# Patient Record
Sex: MALE | Race: WHITE
[De-identification: names, ages, dates, MRNs, and addresses within clinical notes are randomized per-mention and may not be internally consistent; named-entity substitution may affect disease eponyms.]

---

## 2018-06-02 ENCOUNTER — HOSPITAL ENCOUNTER (EMERGENCY)
Dept: HOSPITAL 17 - PHEFT | Age: 33
Discharge: HOME | End: 2018-06-02
Payer: SELF-PAY

## 2018-06-02 VITALS
SYSTOLIC BLOOD PRESSURE: 134 MMHG | OXYGEN SATURATION: 98 % | HEART RATE: 119 BPM | TEMPERATURE: 99 F | DIASTOLIC BLOOD PRESSURE: 73 MMHG | RESPIRATION RATE: 20 BRPM

## 2018-06-02 DIAGNOSIS — L03.116: Primary | ICD-10-CM

## 2018-06-02 PROCEDURE — 99283 EMERGENCY DEPT VISIT LOW MDM: CPT

## 2018-06-02 NOTE — PD
HPI


Chief Complaint:  Skin Problem


Time Seen by Provider:  13:40


Travel History


International Travel<30 days:  No


Contact w/Intl Traveler<30days:  No


Traveled to known affect area:  No





History of Present Illness


HPI


33-year-old male presents emergency department for evaluation of left lower 

extremity swelling that started 3 days ago.  Says that he may been bit by 

something and the leg has become more swollen and red so decided to come to 

emergency department to department today for evaluation.  Denies pain to the 

area.  Says he has been able to get some white fluid out of a lesion in the 

center of the redness.  Says he was recently released from care home and believes 

that he may have picked up an infection there.  He is unsure if the spider bit 

him or if it is an ingrown hair.  Denies numbness or tingling or any other 

complaints today regarding this. He denies fevers or chills. His tetanus was 

within the last 5 years.





PFSH


Past Medical History


Diminished Hearing:  No


Tetanus Vaccination:  < 5 Years


Influenza Vaccination:  No





Social History


Alcohol Use:  No (DENIES)


Tobacco Use:  No


Substance Use:  Yes





Allergies-Medications


(Allergen,Severity, Reaction):  


Coded Allergies:  


     No Known Allergies (Verified  Adverse Reaction, Unknown, 6/2/18)


Reported Meds & Prescriptions





Reported Meds & Active Scripts


Active


Bactrim DS (Sulfamethoxazole-Trimethoprim) 800-160 Mg Tab 1 Tab PO BID


Keflex (Cephalexin) 500 Mg Cap 500 Mg PO Q8H 10 Days








Review of Systems


Except as stated in HPI:  all other systems reviewed are Neg





Physical Exam


Narrative


GENERAL: Well-nourished, well-developed patient, slightly anxious.


SKIN: Focused skin assessment warm/dry. No rashes or lesions.


LLE- mid calf area of central puncta with surrounding erythema. edema noted to 

lower extremity. neurovascularly intact lower extremity.


HEAD: Normocephalic. Atraumatic.


EYES: No scleral icterus. No injection or drainage.  PERRLA, EOMI


THROAT: No pharyngeal injection, exudates, or tonsillar hypertrophy. Airway is 

patent.


NECK: Supple, trachea midline. No JVD or lymphadenopathy. No meningismus.


MUSCULOSKELETAL: No cyanosis, or edema. 


BACK: Nontender without obvious deformity. No CVA tenderness.





Data


Data


Last Documented VS





Vital Signs








  Date Time  Temp Pulse Resp B/P (MAP) Pulse Ox O2 Delivery O2 Flow Rate FiO2


 


6/2/18 13:07 99.0 119 20 134/73 (93) 98   








Orders





 Orders


Ed Discharge Order (6/2/18 13:53)








MDM


Medical Decision Making


Medical Screen Exam Complete:  Yes


Emergency Medical Condition:  Yes


Differential Diagnosis


Left lower extremity cellulitis, erysipelas, abscess


Narrative Course


33-year-old male presents emergency department for evaluation of left lower 

extremity swelling that started 3 days ago.  Says that he may been bit by 

something and the leg has become more swollen and red so decided to come to 

emergency department to department today for evaluation.  Denies pain to the 

area.  Says he has been able to get some white fluid out of a lesion in the 

center of the redness.  Says he was recently released from care home and believes 

that he may have picked up an infection there.  He is unsure if the spider bit 

him or if it is an ingrown hair.  Denies numbness or tingling or any other 

complaints today regarding this. He denies fevers or chills. His tetanus was 

within the last 5 years. 


Vital signs demonstrate temperature 99.0, . 


Note that patient appeared to be somewhat anxious but nontoxic.  The erythema 

about the left calf is focused around the lesion.  There is no lymph 

angiopathic spread.  There is mild edema distally.  Neurovascular intact left 

lower extremity.


Because the patient is young and healthy, I do not believe labs are necessary. 

I believe that if he does not take his antibiotics that he may worsen. I 

explained the importance of starting these medications today and he 

understands. I believe this may take 5-7 days to improve.


He is discharged with Bactrim and Keflex. He should return if his symptoms 

persist or worsen.





Diagnosis





 Primary Impression:  


 Cellulitis


 Qualified Codes:  L03.116 - Cellulitis of left lower limb


Referrals:  


[facility]





***Additional Instructions:  


Use warm compresses directly over the area to assist drainage for 1-2 days.


He may use cool compresses for the swelling otherwise.  Recommend elevating the 

leg to reduce swelling.


He may use Tylenol or Motrin per package instructions for pain.


Take antibiotics as prescribed to reduce complications of this infection.


Scripts


Sulfamethoxazole-Trimethoprim (Bactrim DS) 800-160 Mg Tab


1 TAB PO BID for Infection, #20 TAB 0 Refills


   Prov: Jayda Rios MD         6/2/18 


Cephalexin (Keflex) 500 Mg Cap


500 MG PO Q8H for Infection for 10 Days, #30 CAP 0 Refills


   Prov: Jayda Rios MD         6/2/18


Disposition:  01 DISCHARGE HOME


Condition:  Stable











Leelee Talamantes Jun 2, 2018 13:53